# Patient Record
Sex: MALE | Race: WHITE | NOT HISPANIC OR LATINO | Employment: FULL TIME | URBAN - NONMETROPOLITAN AREA
[De-identification: names, ages, dates, MRNs, and addresses within clinical notes are randomized per-mention and may not be internally consistent; named-entity substitution may affect disease eponyms.]

---

## 2017-06-14 ENCOUNTER — OFFICE VISIT - GICH (OUTPATIENT)
Dept: FAMILY MEDICINE | Facility: OTHER | Age: 27
End: 2017-06-14

## 2017-06-14 ENCOUNTER — HOSPITAL ENCOUNTER (OUTPATIENT)
Dept: RADIOLOGY | Facility: OTHER | Age: 27
End: 2017-06-14
Attending: NURSE PRACTITIONER

## 2017-06-14 ENCOUNTER — HISTORY (OUTPATIENT)
Dept: FAMILY MEDICINE | Facility: OTHER | Age: 27
End: 2017-06-14

## 2017-06-14 DIAGNOSIS — S69.91XA UNSPECIFIED INJURY OF RIGHT WRIST, HAND AND FINGER(S), INITIAL ENCOUNTER: ICD-10-CM

## 2017-06-14 DIAGNOSIS — S63.601A SPRAIN OF RIGHT THUMB: ICD-10-CM

## 2017-12-28 NOTE — PATIENT INSTRUCTIONS
Patient Information     Patient Name MRN Sex Juliano Kauffman [id number] Male 1990      Patient Instructions by Ann Avitia NP at 2017  1:15 PM     Author:  Ann Avitia NP Service:  (none) Author Type:  PHYS- Nurse Practitioner     Filed:  2017  2:15 PM Encounter Date:  2017 Status:  Signed     :  Ann Avitia NP (PHYS- Nurse Practitioner)            The x-ray today showed a possible fracture. Radiologist will review the X-ray within 24-48 hours, I will contact you if the radiologist finds anything of significance on the x-ray that I did not see.    Rest the right thumb, avoid any activity which causes pain.    Wear finger splint for the next 2 weeks    Apply cold packs to the affected area for 15-20 minutes, 4 times a day. A bag of frozen corn or peas often works well as a cold pack. A cold pack is usually the best treatment for the 1st 2 days after an injury. After 48 hours, apply heat or ice, whichever gives relief.      Elevate the injured area as much as you are able. If you can get this higher than the heart, this will help minimize pain and swelling.    May take ibuprofen (Advil, Motrin) or naproxen (Aleve) as needed for pain, swelling or stiffness. Take this type of medication with food to minimize any stomach irritation. Tylenol may also be taken to help ease the pain.    Call or return to clinic as needed if your pain becomes significantly worse, or fails to improve as anticipated despite following the above recommendations.

## 2017-12-28 NOTE — PROGRESS NOTES
"Patient Information     Patient Name MRN Sex Juliano Kauffman [id number] Male 1990      Progress Notes by Ann Avitia NP at 2017  1:15 PM     Author:  Ann Avitia NP Service:  (none) Author Type:  PHYS- Nurse Practitioner     Filed:  2017  4:08 PM Encounter Date:  2017 Status:  Signed     :  Ann Avitia NP (PHYS- Nurse Practitioner)            HPI:    Juliano Meadows is a 26 y.o. male who presents to clinic today for thumb injury.   States he was moving some weights and pushed the discs/plates with his right hand and had immediate pressure, bruising, and swelling.  This occurred this morning.  States minimal pain.  Swelling decreased some after icing.  Mild numbness and tingling.  Right handed.  Iced it for 20 minutes.  No tylenol or ibuprofen.  No blood thinners.            No past medical history on file.  No past surgical history on file.  Social History      Substance Use Topics        Smoking status:  Never Smoker     Smokeless tobacco:  Not on file     Alcohol use  7.2 oz/week      12 Cans of beer per week       No current outpatient prescriptions on file.     No current facility-administered medications for this visit.      Medications have been reviewed by me and are current to the best of my knowledge and ability.    Not on File    Past medical history, past surgical history, current medications and allergies reviewed and accurate to the best of my knowledge.        ROS:  Refer to HPI    /80  Pulse 76  Temp 98  F (36.7  C) (Tympanic)   Resp 20  Ht 1.854 m (6' 1\")  Wt 134.7 kg (297 lb)  BMI 39.18 kg/m2    EXAM:  General Appearance: Well appearing adult male, appropriate appearance for age. No acute distress  Respiratory:   Normal effort.    Cardiovascular:  CMS intact to right upper extremity, brisk capillary refill, strong radial pulse  Musculoskeletal:  Right thumb with generalized swelling, non tender to palpation.  No tenderness over " the thumb DIP or MCP joints.  Normal ROM of right thumb without difficulty.  Right hand and wrist without swelling or tenderness to palpation.  Normal ROM of right wrist without difficulty.  Dermatological:  Right thumb with generalized bruising from DIP joint to base of thumb.  Skin intact to right thumb.  No erythema.   Psychological: normal affect, alert and pleasant      Xray:  PROCEDURE:  XR FINGER 3 VIEWS RIGHT  HISTORY: Thumb injury, right, initial encounter. DIP joint pain  COMPARISON:  None.  TECHNIQUE:  3 views of the right thumb were obtained.  FINDINGS:  No fracture or dislocation is identified. The joint spaces are preserved.    IMPRESSION: No acute fracture.    Electronically Signed By: Neyda Bergeron M.D. on 6/14/2017 2:19 PM        ASSESSMENT/PLAN:    ICD-10-CM    1. Thumb injury, right, initial encounter S69.91XA XR FINGER 3 VIEWS RIGHT      FINGER SPLINT ALUMAFORM   2. Sprain of right thumb, unspecified site of finger, initial encounter S63.601A          Xray of right thumb completed and reviewed, appears to have irregularity at DIP joint, seen on one view, radiologist over read negative for fracture or dislocation   Discussed options of thumb spica wrist brace or aluminum foam padded finger splint - patient declines thumb spica brace  Aluminum Foam padded finger splint provided with instructions to wear for the next 1-2 weeks for thumb sprain  Avoid over straining or over use of the right thumb for the next 2 weeks   ICE  Elevate  Tylenol or ibuprofen PRN  Follow up if symptoms persist or worsen or concerns          Patient Instructions   The x-ray today showed a possible fracture. Radiologist will review the X-ray within 24-48 hours, I will contact you if the radiologist finds anything of significance on the x-ray that I did not see.    Rest the right thumb, avoid any activity which causes pain.    Wear finger splint for the next 2 weeks    Apply cold packs to the affected area for 15-20  minutes, 4 times a day. A bag of frozen corn or peas often works well as a cold pack. A cold pack is usually the best treatment for the 1st 2 days after an injury. After 48 hours, apply heat or ice, whichever gives relief.      Elevate the injured area as much as you are able. If you can get this higher than the heart, this will help minimize pain and swelling.    May take ibuprofen (Advil, Motrin) or naproxen (Aleve) as needed for pain, swelling or stiffness. Take this type of medication with food to minimize any stomach irritation. Tylenol may also be taken to help ease the pain.    Call or return to clinic as needed if your pain becomes significantly worse, or fails to improve as anticipated despite following the above recommendations.

## 2017-12-30 NOTE — NURSING NOTE
Patient Information     Patient Name MRN Sex Juliano Kauffman [id number] Male 1990      Nursing Note by Shawnee Bloom at 2017  1:15 PM     Author:  Shawnee Bloom Service:  (none) Author Type:  (none)     Filed:  2017  1:35 PM Encounter Date:  2017 Status:  Signed     :  Shawnee Bolom            Patient presents to clinic with right thumb injury. He states it happened in the weight room. A weight was stuck and he remove it and his thumb was not in the proper alignment afterwards. He moved it back into place afterwards and is concerned about alignment.  Shawnee Guido ....................  2017   1:23 PM

## 2018-01-26 VITALS
BODY MASS INDEX: 39.36 KG/M2 | RESPIRATION RATE: 20 BRPM | HEIGHT: 73 IN | SYSTOLIC BLOOD PRESSURE: 126 MMHG | TEMPERATURE: 98 F | HEART RATE: 76 BPM | WEIGHT: 297 LBS | DIASTOLIC BLOOD PRESSURE: 80 MMHG

## 2018-03-06 ENCOUNTER — DOCUMENTATION ONLY (OUTPATIENT)
Dept: FAMILY MEDICINE | Facility: OTHER | Age: 28
End: 2018-03-06

## 2020-02-05 DIAGNOSIS — G43.009 COMMON MIGRAINE: Primary | ICD-10-CM

## 2020-02-10 DIAGNOSIS — G43.001 MIGRAINE WITHOUT AURA AND WITH STATUS MIGRAINOSUS, NOT INTRACTABLE: ICD-10-CM

## 2020-02-10 DIAGNOSIS — G43.009 COMMON MIGRAINE: Primary | ICD-10-CM

## 2020-02-10 LAB
ALBUMIN SERPL-MCNC: 4.8 G/DL (ref 3.5–5.7)
ALP SERPL-CCNC: 58 U/L (ref 34–104)
ALT SERPL W P-5'-P-CCNC: 59 U/L (ref 7–52)
ANION GAP SERPL CALCULATED.3IONS-SCNC: 8 MMOL/L (ref 3–14)
AST SERPL W P-5'-P-CCNC: 32 U/L (ref 13–39)
BILIRUB SERPL-MCNC: 0.8 MG/DL (ref 0.3–1)
BUN SERPL-MCNC: 13 MG/DL (ref 7–25)
CALCIUM SERPL-MCNC: 9.7 MG/DL (ref 8.6–10.3)
CHLORIDE SERPL-SCNC: 101 MMOL/L (ref 98–107)
CHOLEST SERPL-MCNC: 216 MG/DL
CO2 SERPL-SCNC: 30 MMOL/L (ref 21–31)
CREAT SERPL-MCNC: 1.05 MG/DL (ref 0.7–1.3)
GFR SERPL CREATININE-BSD FRML MDRD: 82 ML/MIN/{1.73_M2}
GLUCOSE SERPL-MCNC: 89 MG/DL (ref 70–105)
HDLC SERPL-MCNC: 48 MG/DL (ref 23–92)
LDLC SERPL CALC-MCNC: 145 MG/DL
NONHDLC SERPL-MCNC: 168 MG/DL
POTASSIUM SERPL-SCNC: 3.9 MMOL/L (ref 3.5–5.1)
PROT SERPL-MCNC: 7.4 G/DL (ref 6.4–8.9)
SODIUM SERPL-SCNC: 139 MMOL/L (ref 134–144)
T3 SERPL-MCNC: 118 NG/DL (ref 87–178)
T3FREE SERPL-MCNC: 4.2 PG/ML (ref 2.5–3.9)
T4 FREE SERPL-MCNC: 0.8 NG/DL (ref 0.6–1.6)
T4 SERPL-MCNC: 6.4 UG/DL (ref 6.1–12.2)
TRIGL SERPL-MCNC: 115 MG/DL
TSH SERPL DL<=0.05 MIU/L-ACNC: 6.88 IU/ML (ref 0.34–5.6)

## 2020-02-10 PROCEDURE — 84439 ASSAY OF FREE THYROXINE: CPT | Mod: ZL

## 2020-02-10 PROCEDURE — 84481 FREE ASSAY (FT-3): CPT | Mod: ZL

## 2020-02-10 PROCEDURE — 84442 ASSAY OF THYROID ACTIVITY: CPT | Mod: ZL | Performed by: CHIROPRACTOR

## 2020-02-10 PROCEDURE — 36415 COLL VENOUS BLD VENIPUNCTURE: CPT | Mod: ZL

## 2020-02-10 PROCEDURE — 84480 ASSAY TRIIODOTHYRONINE (T3): CPT | Mod: ZL,XU

## 2020-02-10 PROCEDURE — 84443 ASSAY THYROID STIM HORMONE: CPT | Mod: ZL

## 2020-02-10 PROCEDURE — 80053 COMPREHEN METABOLIC PANEL: CPT | Mod: ZL | Performed by: CHIROPRACTOR

## 2020-02-10 PROCEDURE — 80061 LIPID PANEL: CPT | Mod: ZL | Performed by: CHIROPRACTOR

## 2020-02-10 PROCEDURE — 84436 ASSAY OF TOTAL THYROXINE: CPT | Mod: ZL,XU

## 2020-02-10 PROCEDURE — 86376 MICROSOMAL ANTIBODY EACH: CPT | Mod: ZL | Performed by: CHIROPRACTOR

## 2020-02-12 LAB — THYROPEROXIDASE AB SERPL-ACNC: 121 IU/ML

## 2020-02-13 LAB — T4BG SERPL-MCNC: 20.2 UG/ML (ref 13–30)

## 2023-05-11 ENCOUNTER — OFFICE VISIT (OUTPATIENT)
Dept: FAMILY MEDICINE | Facility: OTHER | Age: 33
End: 2023-05-11
Payer: COMMERCIAL

## 2023-05-11 VITALS
BODY MASS INDEX: 41.75 KG/M2 | SYSTOLIC BLOOD PRESSURE: 148 MMHG | RESPIRATION RATE: 24 BRPM | HEIGHT: 73 IN | TEMPERATURE: 98.3 F | DIASTOLIC BLOOD PRESSURE: 106 MMHG | HEART RATE: 80 BPM | WEIGHT: 315 LBS | OXYGEN SATURATION: 97 %

## 2023-05-11 DIAGNOSIS — R52 PAIN: ICD-10-CM

## 2023-05-11 DIAGNOSIS — L98.9 SKIN LESION: Primary | ICD-10-CM

## 2023-05-11 PROCEDURE — 99203 OFFICE O/P NEW LOW 30 MIN: CPT

## 2023-05-11 PROCEDURE — 96372 THER/PROPH/DIAG INJ SC/IM: CPT

## 2023-05-11 PROCEDURE — 250N000011 HC RX IP 250 OP 636

## 2023-05-11 RX ORDER — KETOROLAC TROMETHAMINE 30 MG/ML
30 INJECTION, SOLUTION INTRAMUSCULAR; INTRAVENOUS ONCE
Status: COMPLETED | OUTPATIENT
Start: 2023-05-11 | End: 2023-05-11

## 2023-05-11 RX ADMIN — KETOROLAC TROMETHAMINE 30 MG: 30 INJECTION, SOLUTION INTRAMUSCULAR; INTRAVENOUS at 16:37

## 2023-05-11 ASSESSMENT — PAIN SCALES - GENERAL: PAINLEVEL: SEVERE PAIN (6)

## 2023-05-11 NOTE — PROGRESS NOTES
ASSESSMENT/PLAN:  (L98.9) Skin lesion  (primary encounter diagnosis); Questionable pyogenic granuloma; (R52) Pain  Comment: Patient with a 2-week history of lesion on his finger.  He notes that 2 weeks ago he felt like he may be had gotten a sliver on his right second digit and he cut into it to try to remove it but there is nothing there.  He then believed it was the start of a wart and has been filing it down but notes that the lesion continuously bleeds and it is difficult to get it to stop bleeding. At the base of the right second digit there is a nonfluctuant lesion present, edges appear callused; no surrounding erythema, no purulent discharge, there is significant amount of sanguinous discharge.  I did have a second provider examine lesion with me and she feels this is likely a pyogenic granuloma.  I did review images with collaborating physician who is in agreement to plan.  It is difficult to assess as patient has cut into the lesion and also filed it.  But due to the excessive bleeding there is a concern for pyogenic granuloma.  No signs of infection on exam.  At this time I would like patient to follow-up with general surgery to assess the lesion and determine the plan of care.  Plan: Adult General Surg Referral;ketorolac (TORADOL) injection 30 mg  For skin lesion I recommend you keep it clean dry and covered.  Avoid irritation to the site including any additional filing or cutting.  For discomfort I recommend over-the-counter ibuprofen or Tylenol.  I recommend following up with general surgery for reevaluation next week.  No signs of infection present on exam today but if you do develop any signs or symptoms of infection such as fever, redness surrounding the lesion, or any pussy discharge I recommend you follow-up for reevaluation in [facility] or with primary care.    May use over-the-counter Tylenol or ibuprofen PRN    Discussed warning signs/symptoms indicative of need to f/u    Follow up if  "symptoms persist or worsen or concerns    I have reviewed the nursing notes.  I have reviewed the findings, diagnosis, plan and need for follow up with the patient.    I explained my diagnostic considerations and recommendations to the patient, who voiced understanding and agreement with the treatment plan. All questions were answered. We discussed potential side effects of any prescribed or recommended therapies, as well as expectations for response to treatments.    CANDIDO KELLY, MAAME CNP  5/11/2023  3:49 PM    HPI:    Juliano Meadows is a 32 year old male  who [facility] for concerns of infection of right second finger.    Has concerns about a blister on the right pointer finger.  She reports that he got something stuck in his finger 2 weeks ago and thought it was a start of a wart.  Every time he tried to file it down it would bleed nonstop now the finger is in a lot of pain and looks infected. It bleeds whenever he messes with it.     No PCP on file.    No known medication allergies.     No past medical history on file.  No past surgical history on file.  Social History     Tobacco Use     Smoking status: Never     Smokeless tobacco: Not on file   Vaping Use     Vaping status: Never Used   Substance Use Topics     Alcohol use: Not Currently     Alcohol/week: 12.0 standard drinks of alcohol     Types: 12 Standard drinks or equivalent per week     No current outpatient medications on file.     No Known Allergies  Past medical history, past surgical history, current medications and allergies reviewed and accurate to the best of my knowledge.      ROS:  Refer to HPI    BP (!) 148/106 (BP Location: Left arm, Patient Position: Sitting, Cuff Size: Adult Large)   Pulse 80   Temp 98.3  F (36.8  C) (Tympanic)   Resp 24   Ht 1.854 m (6' 1\")   Wt (!) 156.9 kg (345 lb 12.8 oz)   SpO2 97%   BMI 45.62 kg/m      EXAM:  General Appearance: Well appearing 32 year old male, appropriate appearance " for age. No acute distress   Eyes: conjunctivae normal without erythema or irritation, corneas clear, no drainage or crusting, no eyelid swelling, pupils equal   Oropharynx: moist mucous membranes,  voice clear.    Nose:  Bilateral nares: no erythema, no edema, no drainage or congestion   Neck: supple without adenopathy  Respiratory: normal chest wall and respirations.  Normal effort.  Clear to auscultation bilaterally, no wheezing, crackles or rhonchi.  No increased work of breathing.  No cough appreciated.  Cardiac: RRR with no murmurs  Abdomen: soft, nontender, no rigidity, no rebound tenderness or guarding, normal bowel sounds present  Musculoskeletal:  Equal movement of bilateral upper extremities.  Equal movement of bilateral lower extremities.  Normal gait.    Right second digit: At the base of the right second digit there is a nonfluctuant lesion present, edges appear callused; no surrounding erythema, no purulent discharge, there is significant amount of sanguinous discharge.  Dermatological: no rashes noted of exposed skin  Neuro: Alert and oriented to person, place, and time.  Cranial nerves II-XII grossly intact with no focal or lateralizing deficits.  Muscle tone normal.  Gait normal. No tremor.   Psychological: normal affect, alert, oriented, and pleasant.

## 2023-05-11 NOTE — NURSING NOTE
"Pt presents to  for boil/infection on his R hand pointer finger. Pt states he got something stuck in his finger 2 wks ago and he thought it was the start of a wart. Everytime he tried to file it down, it would bleed nonstop. Finger is now is a lot of pain and is infected.    Chief Complaint   Patient presents with     Finger     Blister/boil on R pointer finger       FOOD SECURITY SCREENING QUESTIONS  Hunger Vital Signs:  Within the past 12 months we worried whether our food would run out before we got money to buy more. Never  Within the past 12 months the food we bought just didn't last and we didn't have money to get more. Never  Neyda Dearmon 5/11/2023 3:53 PM      Initial BP (!) 148/106 (BP Location: Left arm, Patient Position: Sitting, Cuff Size: Adult Large)   Pulse 80   Temp 98.3  F (36.8  C) (Tympanic)   Resp 24   Ht 1.854 m (6' 1\")   Wt (!) 156.9 kg (345 lb 12.8 oz)   SpO2 97%   BMI 45.62 kg/m   Estimated body mass index is 45.62 kg/m  as calculated from the following:    Height as of this encounter: 1.854 m (6' 1\").    Weight as of this encounter: 156.9 kg (345 lb 12.8 oz).  Medication Reconciliation: complete    Neyda Deajosuéon  "

## 2023-05-11 NOTE — PATIENT INSTRUCTIONS
For skin lesion I recommend you keep it clean dry and covered.  Avoid irritation to the site including any additional filing or cutting.  For discomfort I recommend over-the-counter ibuprofen or Tylenol.  I recommend following up with general surgery for reevaluation next week.  No signs of infection present on exam today but if you do develop any signs or symptoms of infection such as fever, redness surrounding the lesion, or any pussy discharge I recommend you follow-up for reevaluation in [facility] or with primary care.

## 2023-05-31 DIAGNOSIS — R22.31 FINGER MASS, RIGHT: Primary | ICD-10-CM

## (undated) RX ORDER — KETOROLAC TROMETHAMINE 30 MG/ML
INJECTION, SOLUTION INTRAMUSCULAR; INTRAVENOUS
Status: DISPENSED
Start: 2023-05-11